# Patient Record
Sex: MALE | Race: WHITE | Employment: UNEMPLOYED | URBAN - METROPOLITAN AREA
[De-identification: names, ages, dates, MRNs, and addresses within clinical notes are randomized per-mention and may not be internally consistent; named-entity substitution may affect disease eponyms.]

---

## 2018-01-19 PROBLEM — I48.19 PERSISTENT ATRIAL FIBRILLATION (HCC): Status: ACTIVE | Noted: 2018-01-19

## 2020-02-26 PROBLEM — R07.9 CHEST PAIN: Status: ACTIVE | Noted: 2020-02-26

## 2020-02-27 PROBLEM — R91.8 LUNG NODULES: Status: ACTIVE | Noted: 2020-02-27

## 2020-06-18 PROBLEM — Z86.718 PERSONAL HISTORY OF OTHER VENOUS THROMBOSIS AND EMBOLISM: Status: ACTIVE | Noted: 2018-09-15

## 2020-06-18 PROBLEM — N40.0 BPH (BENIGN PROSTATIC HYPERPLASIA): Status: ACTIVE | Noted: 2019-10-02

## 2020-06-18 PROBLEM — F17.200 TOBACCO DEPENDENCE: Status: ACTIVE | Noted: 2019-10-02

## 2020-06-18 PROBLEM — Z86.711 HISTORY OF PULMONARY EMBOLISM: Status: ACTIVE | Noted: 2018-09-15

## 2020-06-18 PROBLEM — F10.129 ALCOHOL ABUSE WITH INTOXICATION, UNSPECIFIED (HCC): Status: ACTIVE | Noted: 2018-09-15

## 2020-06-18 PROBLEM — F10.21 ALCOHOL DEPENDENCE IN REMISSION (HCC): Status: ACTIVE | Noted: 2019-10-03

## 2020-06-18 PROBLEM — I10 ESSENTIAL HYPERTENSION: Status: ACTIVE | Noted: 2018-09-15

## 2020-06-18 PROBLEM — L30.1 DYSHIDROTIC ECZEMA: Status: ACTIVE | Noted: 2019-10-02

## 2020-06-18 PROBLEM — K21.9 GASTRO-ESOPHAGEAL REFLUX DISEASE WITHOUT ESOPHAGITIS: Status: ACTIVE | Noted: 2018-09-15

## 2021-04-09 PROBLEM — F32.A ANXIETY AND DEPRESSION: Status: ACTIVE | Noted: 2021-04-09

## 2021-04-09 PROBLEM — Z72.0 TOBACCO USE: Status: ACTIVE | Noted: 2021-04-09

## 2021-04-09 PROBLEM — D68.51 FACTOR V LEIDEN MUTATION (HCC): Status: ACTIVE | Noted: 2021-04-09

## 2021-04-09 PROBLEM — K22.70 BARRETT'S ESOPHAGUS WITHOUT DYSPLASIA: Status: ACTIVE | Noted: 2021-04-09

## 2021-04-09 PROBLEM — F41.9 ANXIETY AND DEPRESSION: Status: ACTIVE | Noted: 2021-04-09

## 2021-04-09 PROBLEM — R07.9 CHEST PAIN: Status: RESOLVED | Noted: 2020-02-26 | Resolved: 2021-04-09

## 2021-04-09 PROBLEM — L20.82 FLEXURAL ECZEMA: Status: ACTIVE | Noted: 2021-04-09

## 2021-04-12 PROBLEM — R07.9 CHEST PAIN: Status: ACTIVE | Noted: 2021-04-12

## 2021-04-12 PROBLEM — I26.99 PULMONARY EMBOLI (HCC): Status: ACTIVE | Noted: 2021-04-12

## 2021-07-04 PROBLEM — F10.139 ALCOHOL ABUSE WITH WITHDRAWAL, UNSPECIFIED (HCC): Status: ACTIVE | Noted: 2021-07-04

## 2021-07-04 PROBLEM — F10.20 SEVERE ALCOHOL USE DISORDER (HCC): Chronic | Status: ACTIVE | Noted: 2021-07-04

## 2021-07-04 PROBLEM — F10.930 ALCOHOL WITHDRAWAL SYNDROME WITHOUT COMPLICATION (HCC): Status: ACTIVE | Noted: 2021-07-04

## 2021-07-04 PROBLEM — Z59.00 HOMELESS: Chronic | Status: ACTIVE | Noted: 2021-07-04

## 2021-08-15 PROBLEM — R55 SYNCOPE AND COLLAPSE: Status: ACTIVE | Noted: 2021-08-15

## 2021-08-16 PROBLEM — U07.1 COVID-19: Status: ACTIVE | Noted: 2021-08-16

## 2021-08-16 PROBLEM — I95.1 ORTHOSTATIC HYPOTENSION: Status: ACTIVE | Noted: 2021-08-16

## 2021-08-16 PROBLEM — J40 BRONCHITIS DUE TO COVID-19 VIRUS: Status: ACTIVE | Noted: 2021-08-16

## 2022-11-02 ENCOUNTER — OFFICE VISIT (OUTPATIENT)
Dept: INTERNAL MEDICINE | Age: 57
End: 2022-11-02
Payer: COMMERCIAL

## 2022-11-02 VITALS
DIASTOLIC BLOOD PRESSURE: 98 MMHG | HEIGHT: 74 IN | HEART RATE: 84 BPM | WEIGHT: 290 LBS | RESPIRATION RATE: 16 BRPM | BODY MASS INDEX: 37.22 KG/M2 | OXYGEN SATURATION: 98 % | SYSTOLIC BLOOD PRESSURE: 144 MMHG

## 2022-11-02 DIAGNOSIS — G89.29 CHRONIC BILATERAL LOW BACK PAIN WITH BILATERAL SCIATICA: ICD-10-CM

## 2022-11-02 DIAGNOSIS — Z72.0 TOBACCO USE: ICD-10-CM

## 2022-11-02 DIAGNOSIS — L20.82 FLEXURAL ECZEMA: Primary | ICD-10-CM

## 2022-11-02 DIAGNOSIS — R91.8 LUNG NODULES: ICD-10-CM

## 2022-11-02 DIAGNOSIS — M54.41 CHRONIC BILATERAL LOW BACK PAIN WITH BILATERAL SCIATICA: ICD-10-CM

## 2022-11-02 DIAGNOSIS — D68.51 FACTOR V LEIDEN MUTATION (HCC): ICD-10-CM

## 2022-11-02 DIAGNOSIS — F33.42 RECURRENT MAJOR DEPRESSIVE DISORDER, IN FULL REMISSION (HCC): ICD-10-CM

## 2022-11-02 DIAGNOSIS — Z86.718 PERSONAL HISTORY OF OTHER VENOUS THROMBOSIS AND EMBOLISM: ICD-10-CM

## 2022-11-02 DIAGNOSIS — K22.70 BARRETT'S ESOPHAGUS WITHOUT DYSPLASIA: ICD-10-CM

## 2022-11-02 DIAGNOSIS — E66.01 CLASS 2 SEVERE OBESITY DUE TO EXCESS CALORIES WITH SERIOUS COMORBIDITY AND BODY MASS INDEX (BMI) OF 37.0 TO 37.9 IN ADULT (HCC): ICD-10-CM

## 2022-11-02 DIAGNOSIS — F10.21 ALCOHOL DEPENDENCE IN REMISSION (HCC): ICD-10-CM

## 2022-11-02 DIAGNOSIS — I10 ESSENTIAL HYPERTENSION: ICD-10-CM

## 2022-11-02 DIAGNOSIS — M54.42 CHRONIC BILATERAL LOW BACK PAIN WITH BILATERAL SCIATICA: ICD-10-CM

## 2022-11-02 DIAGNOSIS — I48.0 PAROXYSMAL ATRIAL FIBRILLATION (HCC): ICD-10-CM

## 2022-11-02 PROBLEM — I26.99 PULMONARY EMBOLI (HCC): Status: RESOLVED | Noted: 2021-04-12 | Resolved: 2022-11-02

## 2022-11-02 PROBLEM — I48.19 PERSISTENT ATRIAL FIBRILLATION (HCC): Status: RESOLVED | Noted: 2018-01-19 | Resolved: 2022-11-02

## 2022-11-02 PROBLEM — F41.9 ANXIETY AND DEPRESSION: Status: RESOLVED | Noted: 2021-04-09 | Resolved: 2022-11-02

## 2022-11-02 PROBLEM — F10.930 ALCOHOL WITHDRAWAL SYNDROME WITHOUT COMPLICATION (HCC): Status: RESOLVED | Noted: 2021-07-04 | Resolved: 2022-11-02

## 2022-11-02 PROBLEM — F32.A ANXIETY AND DEPRESSION: Status: RESOLVED | Noted: 2021-04-09 | Resolved: 2022-11-02

## 2022-11-02 PROBLEM — R07.9 CHEST PAIN: Status: RESOLVED | Noted: 2021-04-12 | Resolved: 2022-11-02

## 2022-11-02 PROBLEM — J40 BRONCHITIS DUE TO COVID-19 VIRUS: Status: RESOLVED | Noted: 2021-08-16 | Resolved: 2022-11-02

## 2022-11-02 PROBLEM — K21.9 GASTRO-ESOPHAGEAL REFLUX DISEASE WITHOUT ESOPHAGITIS: Status: RESOLVED | Noted: 2018-09-15 | Resolved: 2022-11-02

## 2022-11-02 PROBLEM — U07.1 BRONCHITIS DUE TO COVID-19 VIRUS: Status: RESOLVED | Noted: 2021-08-16 | Resolved: 2022-11-02

## 2022-11-02 PROCEDURE — G8484 FLU IMMUNIZE NO ADMIN: HCPCS | Performed by: NURSE PRACTITIONER

## 2022-11-02 PROCEDURE — G8427 DOCREV CUR MEDS BY ELIG CLIN: HCPCS | Performed by: NURSE PRACTITIONER

## 2022-11-02 PROCEDURE — 4004F PT TOBACCO SCREEN RCVD TLK: CPT | Performed by: NURSE PRACTITIONER

## 2022-11-02 PROCEDURE — 3074F SYST BP LT 130 MM HG: CPT | Performed by: NURSE PRACTITIONER

## 2022-11-02 PROCEDURE — 99204 OFFICE O/P NEW MOD 45 MIN: CPT | Performed by: NURSE PRACTITIONER

## 2022-11-02 PROCEDURE — G8417 CALC BMI ABV UP PARAM F/U: HCPCS | Performed by: NURSE PRACTITIONER

## 2022-11-02 PROCEDURE — 3017F COLORECTAL CA SCREEN DOC REV: CPT | Performed by: NURSE PRACTITIONER

## 2022-11-02 PROCEDURE — 3078F DIAST BP <80 MM HG: CPT | Performed by: NURSE PRACTITIONER

## 2022-11-02 RX ORDER — BUPROPION HYDROCHLORIDE 300 MG/1
TABLET ORAL
COMMUNITY
Start: 2022-10-12

## 2022-11-02 RX ORDER — ACETAMINOPHEN 325 MG/1
650 TABLET ORAL EVERY 6 HOURS PRN
COMMUNITY

## 2022-11-02 RX ORDER — POLYETHYLENE GLYCOL 3350 17 G/17G
POWDER, FOR SOLUTION ORAL
COMMUNITY
Start: 2022-09-08

## 2022-11-02 RX ORDER — GABAPENTIN 100 MG/1
100 CAPSULE ORAL 2 TIMES DAILY
Qty: 60 CAPSULE | Refills: 2 | Status: SHIPPED | OUTPATIENT
Start: 2022-11-02 | End: 2022-12-02

## 2022-11-02 RX ORDER — LORATADINE 10 MG/1
TABLET ORAL
COMMUNITY
Start: 2022-01-19

## 2022-11-02 RX ORDER — APIXABAN 5 MG/1
1 TABLET, FILM COATED ORAL 2 TIMES DAILY
COMMUNITY
Start: 2022-10-24 | End: 2022-11-02 | Stop reason: SDUPTHER

## 2022-11-02 RX ORDER — CLOBETASOL PROPIONATE 0.5 MG/G
OINTMENT TOPICAL
Qty: 60 G | Refills: 1 | Status: SHIPPED | OUTPATIENT
Start: 2022-11-02

## 2022-11-02 RX ORDER — APIXABAN 5 MG/1
5 TABLET, FILM COATED ORAL 2 TIMES DAILY
Qty: 60 TABLET | Refills: 5 | Status: SHIPPED | OUTPATIENT
Start: 2022-11-02

## 2022-11-02 RX ORDER — DULOXETIN HYDROCHLORIDE 30 MG/1
CAPSULE, DELAYED RELEASE ORAL
COMMUNITY
Start: 2022-10-12

## 2022-11-02 RX ORDER — LOSARTAN POTASSIUM 50 MG/1
50 TABLET ORAL DAILY
Qty: 30 TABLET | Refills: 1 | Status: SHIPPED | OUTPATIENT
Start: 2022-11-02

## 2022-11-02 SDOH — ECONOMIC STABILITY: FOOD INSECURITY: WITHIN THE PAST 12 MONTHS, YOU WORRIED THAT YOUR FOOD WOULD RUN OUT BEFORE YOU GOT MONEY TO BUY MORE.: NEVER TRUE

## 2022-11-02 SDOH — ECONOMIC STABILITY: FOOD INSECURITY: WITHIN THE PAST 12 MONTHS, THE FOOD YOU BOUGHT JUST DIDN'T LAST AND YOU DIDN'T HAVE MONEY TO GET MORE.: NEVER TRUE

## 2022-11-02 ASSESSMENT — PATIENT HEALTH QUESTIONNAIRE - PHQ9
SUM OF ALL RESPONSES TO PHQ QUESTIONS 1-9: 0
SUM OF ALL RESPONSES TO PHQ QUESTIONS 1-9: 0
2. FEELING DOWN, DEPRESSED OR HOPELESS: 0
8. MOVING OR SPEAKING SO SLOWLY THAT OTHER PEOPLE COULD HAVE NOTICED. OR THE OPPOSITE, BEING SO FIGETY OR RESTLESS THAT YOU HAVE BEEN MOVING AROUND A LOT MORE THAN USUAL: 0
5. POOR APPETITE OR OVEREATING: 0
10. IF YOU CHECKED OFF ANY PROBLEMS, HOW DIFFICULT HAVE THESE PROBLEMS MADE IT FOR YOU TO DO YOUR WORK, TAKE CARE OF THINGS AT HOME, OR GET ALONG WITH OTHER PEOPLE: 0
SUM OF ALL RESPONSES TO PHQ QUESTIONS 1-9: 0
7. TROUBLE CONCENTRATING ON THINGS, SUCH AS READING THE NEWSPAPER OR WATCHING TELEVISION: 0
SUM OF ALL RESPONSES TO PHQ QUESTIONS 1-9: 0
SUM OF ALL RESPONSES TO PHQ9 QUESTIONS 1 & 2: 0
6. FEELING BAD ABOUT YOURSELF - OR THAT YOU ARE A FAILURE OR HAVE LET YOURSELF OR YOUR FAMILY DOWN: 0
1. LITTLE INTEREST OR PLEASURE IN DOING THINGS: 0
4. FEELING TIRED OR HAVING LITTLE ENERGY: 0
9. THOUGHTS THAT YOU WOULD BE BETTER OFF DEAD, OR OF HURTING YOURSELF: 0
3. TROUBLE FALLING OR STAYING ASLEEP: 0

## 2022-11-02 ASSESSMENT — ENCOUNTER SYMPTOMS
BLOOD IN STOOL: 0
WHEEZING: 0
BACK PAIN: 1
CHEST TIGHTNESS: 0
ABDOMINAL PAIN: 1
NAUSEA: 0
DIARRHEA: 0
SHORTNESS OF BREATH: 0
RESPIRATORY NEGATIVE: 1

## 2022-11-02 ASSESSMENT — SOCIAL DETERMINANTS OF HEALTH (SDOH): HOW HARD IS IT FOR YOU TO PAY FOR THE VERY BASICS LIKE FOOD, HOUSING, MEDICAL CARE, AND HEATING?: NOT HARD AT ALL

## 2022-11-02 NOTE — PROGRESS NOTES
[address] [facility] [address] [facility] PRIMARY CARE  [address] [facility] [address]  Dept: [phone]  Dept Fax: [phone]: [phone]     BOBBY Keith (: 1965) is a 65 y.o. male, New patient, here for evaluation of the following chief complaint(s):  New Patient (Here to establish.), Leg Pain (Bilateral leg pain X7 years.), and Skin Problem (Patient has eczema. Would like to try something for it.)      PCP:  HARRIS Lezama CNP      Here today to [facility]. He has had leg pain bilaterally for 7 years. He feels has gotten worse. Feels like is in upper legs where they connect to his body and does go down his legs somewhat. He feels is not getting around as well as used to. Using a cane. He was doing phys therapy, but had been moving around a lot so not sticking with it like should. He was told to use a cane d/t his balance issues. Has a lot of numbness and tingling in his feet. No longer uses alcohol. For the last 4 yrs has been sober \"99% of time\". His lower back feels stiff, does have a fusion. He is not taking his cymbalta like was, he was reduced to 30mg per his preference d/t not liking side effects. Trying to get off of it, he used to be on 60mg. Is taking it every other day for the last month or so. Has bad eczema. Uses triamcinolone which has worked at times, but doesn't feel like doing anything for him anymore. He has issues with sides of his legs and his back, feels is moderate to severe in nature. \"It really sucks\". Seasons changing will cause flare up. Kline's esophagus: Had upper and lower GI this summer. On protonix for it. He still has stomach issues. Review of Systems   Constitutional: Negative. Negative for fatigue and unexpected weight change. Respiratory: Negative. Negative for chest tightness, shortness of breath and wheezing. Cardiovascular: Negative.   Negative for chest pain, palpitations and leg swelling. Gastrointestinal:  Positive for abdominal pain. Negative for blood in stool, diarrhea and nausea. Musculoskeletal:  Positive for back pain and myalgias. Neurological:  Negative for weakness and light-headedness. Psychiatric/Behavioral: Negative. Negative for dysphoric mood. Past Medical History:   Diagnosis Date    A-fib [facility])     Arthritis     Avascular necrosis of bones of both hips ([address]     Kline esophagus     Brain lesion     has to get routine MRI for monitoring. . in corpus callosum, w/neuro    Chronic back pain     2 herniated disks    Depression     history of SI    DVT (deep venous thrombosis) (HCC)     Bilateral lower extremities in his 30's and was treated with Coumadin. Factor 5 Leiden mutation, heterozygous ([address]     GERD (gastroesophageal reflux disease)     History of pulmonary embolus (PE)     In his 29's and was treated with Coumadin.     Homelessness     HTN (hypertension)     Migraines     Neuropathy     bilateral feet    Previous back surgery     Lumbar region    Pulmonary emboli ([facility] 4/12/2021     Past Surgical History:   Procedure Laterality Date    CARDIOVERSION  2008    ESOPHAGUS SURGERY  01/01/1990    lap neisen- tightened at bottom    GALLBLADDER SURGERY      removal    JOINT REPLACEMENT Bilateral 01/01/2015    bilateral hip replacement    POSTERIOR SPINAL FUSION N/A 2002     Social History     Socioeconomic History    Marital status:      Spouse name: Not on file    Number of children: Not on file    Years of education: Not on file    Highest education level: Not on file   Occupational History    Not on file   Tobacco Use    Smoking status: Every Day     Packs/day: 0.50     Years: 3.00     Pack years: 1.50     Types: Cigarettes     Start date: 2019    Smokeless tobacco: Never    Tobacco comments:     7-10/day, vaping   Substance and Sexual Activity    Alcohol use: No     Comment: denies any ETOH use since recent hospitalization    Drug use: No     Comment: caffeine: 2 cups of tea daily    Sexual activity: Not on file   Other Topics Concern    Not on file   Social History Narrative    Not on file     Social Determinants of Health     Financial Resource Strain: Low Risk     Difficulty of Paying Living Expenses: Not hard at all   Food Insecurity: No Food Insecurity    Worried About Running Out of Food in the Last Year: Never true    Ran Out of Food in the Last Year: Never true   Transportation Needs: Not on file   Physical Activity: Not on file   Stress: Not on file   Social Connections: Not on file   Intimate Partner Violence: Not on file   Housing Stability: Not on file     Family History   Problem Relation Age of Onset    Liver Cancer Mother          from liver cancer    Mental Illness Mother     Mental Illness Maternal Aunt         schizophrenia      Allergies   Allergen Reactions    Xarelto [Rivaroxaban]      Prior to Admission medications    Medication Sig Start Date End Date Taking? Authorizing Provider   buPROPion (WELLBUTRIN XL) 300 MG extended release tablet Take 1 Tablet by mouth every morning 10/12/22  Yes Historical Provider, MD   DULoxetine (CYMBALTA) 30 MG extended release capsule Take 1 Capsule by mouth once daily 10/12/22  Yes Historical Provider, MD   loratadine (CLARITIN) 10 MG tablet  22  Yes Historical Provider, MD   polyethylene glycol (MIRALAX) 17 g PACK packet PLEASE SEE ATTACHED FOR DETAILED DIRECTIONS 22  Yes Historical Provider, MD   acetaminophen (TYLENOL) 325 MG tablet Take 650 mg by mouth every 6 hours as needed for Pain   Yes Historical Provider, MD   clobetasol (TEMOVATE) 0.05 % ointment Apply topically 2 times daily. 22  Yes HARRIS Vyas CNP   gabapentin (NEURONTIN) 100 MG capsule Take 1 capsule by mouth 2 times daily for 30 days.  22 Yes HARRIS Vyas CNP   ELIQUIS 5 MG TABS tablet Take 1 tablet by mouth in the morning and at bedtime 22  Yes Farrah Mar, APRN - CNP   losartan (COZAAR) 50 MG tablet Take 1 tablet by mouth daily 11/2/22  Yes Farrah Mar APRN - CNP   pantoprazole (PROTONIX) 40 MG tablet Take 1 tablet by mouth every morning (before breakfast) for 14 days 8/20/21 11/2/22 Yes Beena Mcbride MD                I have reviewed the patient's medical history in detail and updated the computerized patient record. OBJECTIVE    Vitals:    11/02/22 1441 11/02/22 1448 11/02/22 1515   BP: (!) 132/100 (!) 134/104 (!) 144/98   Site: Right Upper Arm     Position: Sitting     Cuff Size: Medium Adult     Pulse: 84     Resp: 16     SpO2: 98%     Weight: 290 lb (131.5 kg)     Height: 6' 2.02\" (1.88 m)         Physical Exam  Vitals and nursing note reviewed. Constitutional:       General: He is not in acute distress. Appearance: Normal appearance. He is well-developed. He is obese. HENT:      Head: Normocephalic and atraumatic. Neck:      Thyroid: No thyromegaly. Cardiovascular:      Rate and Rhythm: Normal rate and regular rhythm. Heart sounds: Normal heart sounds. No murmur heard. Pulmonary:      Effort: Pulmonary effort is normal. No respiratory distress. Breath sounds: Normal breath sounds. No wheezing. Musculoskeletal:      Cervical back: Normal range of motion. Lumbar back: Tenderness (across lower lumbar area) present. Decreased range of motion. Lymphadenopathy:      Cervical: No cervical adenopathy. Skin:     General: Skin is warm and dry. Findings: Rash (dry rough patches to upper thighs, appears to be resolving. back without any rash, more of a keratosis pilaris appearance to upper back) present. Neurological:      Mental Status: He is alert and oriented to person, place, and time. Gait: Gait abnormal (walking with limp, using cane on right). Psychiatric:         Mood and Affect: Mood normal.         Behavior: Behavior normal.         ASSESSMENT/ PLAN    1. Flexural eczema  Chronic, uncontrolled. Triamcinolone ointment not helping.   - clobetasol (TEMOVATE) 0.05 % ointment; Apply topically 2 times daily. Dispense: 60 g; Refill: 1  -enc to improve skin care: avoid hot showers, use cream based lotions right after showering- eucerin or cetaphil after every shower     2. Recurrent major depressive disorder, in full remission ([facility]  Chronic, stable  Phq9 score negative  -wellbutrin working well. Is weaning off cymbalta d/t his preference so will need to watch for deterioration of mood    3. Paroxysmal atrial fibrillation (HCC)  Chronic, pt reports no issue since 2014 when had cardioversion. Is on eliquis d/t recurrent clots and also factor 5 mutation  - ELIQUIS 5 MG TABS tablet; Take 1 tablet by mouth in the morning and at bedtime  Dispense: 60 tablet; Refill: 5    4. Factor V Leiden mutation ([facility]  Chronic, on eliquis. Sometimes struggles to take twice a day but couldn't tolerate xarelto. Has done better lately. Last yr when had last clot was not taking twice a day routinely.   - ELIQUIS 5 MG TABS tablet; Take 1 tablet by mouth in the morning and at bedtime  Dispense: 60 tablet; Refill: 5    5. Alcohol dependence in remission ([facility]  Chronic, reports being in remission for last 4 yrs \"99% of time\"  -enc to continue sobriety    6. Chronic bilateral low back pain with bilateral sciatica  Chronic, uncontrolled  -reports doing phys therapy which does help but he has moved around so much has not completed it  -weaning off cymbalta d/t side effects, advised this is a chronic pain med. Will try gabapentin  - gabapentin (NEURONTIN) 100 MG capsule; Take 1 capsule by mouth 2 times daily for 30 days. Dispense: 60 capsule; Refill: 2  -[place] reviewed through external source without concerns, was last living in [place] last yr    7. Essential hypertension  Chronic, uncontrolled. Reports when he used to drink heavily had to be on losartan but went away with stopping drinking  -restart on losartan.  Reduce salt intake  - losartan (COZAAR) 50 MG tablet; Take 1 tablet by mouth daily  Dispense: 30 tablet; Refill: 1    8. Kline's esophagus without dysplasia  Chronic, egd done 3/28/22 per care everywhere with Dr. Trinidad Lopes, can't see results though  -continue on protonix  -will need to sign record release for [facility] next visit and [facility]    9. Personal history of other venous thrombosis and embolism  Continue on eliquis. Enc to take bid as directed    10. Class 2 severe obesity due to excess calories with serious comorbidity and body mass index (BMI) of 37.0 to 37.9 in adult [facility])  Chronic, uncontrolled. Poor diet  -The standard range for ages 25 and older is >=18.5 and < 25 kg/m2. Your BMI today was above this range, this falls in the overweight obesity morbid obese category and there are medical benefits to weight loss. BMI monitoring is helful with identifying a weight problem that may be related to a medical condition, or may increase the risk for medical problems. Your BMI and weight management will be followed at subsequent visits with your provider and monitored for progress. GOAL; promoting lifestyle and diet changes in order to reach a healthy weight. 11. Tobacco use  Highly encourage patient to stay away from tobacco products. Patient is not ready to quit. Patient is aware of the risks associated with this habit  -has only been smoking for 3 yrs, enc to stop. He is not motivated, did switch to vaping which he thinks seems better. Advised research is coming that doesn't support that its any less dangerous          Return in about 6 weeks (around 12/14/2022) for bp recheck.      Electronically signed by:  HARRIS Rader CNP   11/2/22

## 2022-11-02 NOTE — PATIENT INSTRUCTIONS
Patient Education        Learning About Benefits From Quitting Smoking  Why is it important to quit smoking? If you're thinking about quitting smoking, you may have a few reasons to be smoke-free. Your health may be one of them. When you quit smoking, you lower your risk for many serious health problems, such as cancer, lung disease, heart attack, stroke, blood vessel disease, and blindness from macular degeneration. When you're smoke-free, you get sick less often, and you heal faster. You are less likely to get colds, flu, bronchitis, and pneumonia. As a nonsmoker, you may find that your mood is better and you are less stressed. When and how will you feel healthier? Quitting has real health benefits that start from day 1 of being smoke-free. And the longer you stay smoke-free, the healthier you get and the better you feel. The first hours  After just 20 minutes, your blood pressure and heart rate go down. That means there's less stress on your heart and blood vessels. Within 12 hours, the level of carbon monoxide in your blood drops back to normal. That makes room for more oxygen. With more oxygen in your body, you may notice that you have more energy than when you smoked. After 2 weeks  Your lungs start to work better. Your risk of heart attack starts to drop. After 1 month  When your lungs are clear, you cough less and breathe deeper, so it's easier to be active. Your sense of taste and smell return. That means you can enjoy food more than you have since you started smoking. Over the years  Over the years, your risks of heart disease, heart attack, and stroke are lower. After 10 years, your risk of dying from lung cancer is cut by about half. And your risk for many other types of cancer is lower too. How would quitting help others in your life? When you quit smoking, you improve the health of everyone who now breathes in your smoke.   Their heart, lung, and cancer risks drop, much like yours.  They are sick less. For babies and small children, living smoke-free means they're less likely to have ear infections, pneumonia, and bronchitis. If you're a woman who is or will be pregnant someday, quitting smoking means a healthier . Children who are close to you are less likely to become adult smokers. Where can you learn more? Go to https://chpepiceweb.ChoicePass. org and sign in to your [employer] account. Enter [id number] [id number] [phone] in the [facility] box to learn more about \"Learning About Benefits From Quitting Smoking. \"     If you do not have an account, please click on the \"Sign Up Now\" link. Current as of: 2021               Content Version: 13.4   Healthwise, [employer]. Care instructions adapted under license by [facility] ([facility]). If you have questions about a medical condition or this instruction, always ask your healthcare professional. [place] [address] any warranty or liability for your use of this information. Patient Education        DASH Diet: Care Instructions  Your Care Instructions     The DASH diet is an eating plan that can help lower your blood pressure. DASH stands for Dietary Approaches to Stop Hypertension. Hypertension is high blood pressure. The DASH diet focuses on eating foods that are high in calcium, potassium, and magnesium. These nutrients can lower blood pressure. The foods that are highest in these nutrients are fruits, vegetables, low-fat dairy products, nuts, seeds, and legumes. But taking calcium, potassium, and magnesium supplements instead of eating foods that are high in those nutrients does not have the same effect. The DASH diet also includes whole grains, fish, and poultry. The DASH diet is one of several lifestyle changes your doctor may recommend to lower your high blood pressure. Your doctor may also want you to decrease the amount of sodium in your diet.  Lowering sodium while following the DASH diet can lower blood pressure even further than just the DASH diet alone. Follow-up care is a key part of your treatment and safety. Be sure to make and go to all appointments, and call your doctor if you are having problems. It's also a good idea to know your test results and keep a list of the medicines you take. How can you care for yourself at home? Following the DASH diet  Eat 4 to 5 servings of fruit each day. A serving is 1 medium-sized piece of fruit, ½ cup chopped or canned fruit, 1/4 cup dried fruit, or 4 ounces (½ cup) of fruit juice. Choose fruit more often than fruit juice. Eat 4 to 5 servings of vegetables each day. A serving is 1 cup of lettuce or raw leafy vegetables, ½ cup of chopped or cooked vegetables, or 4 ounces (½ cup) of vegetable juice. Choose vegetables more often than vegetable juice. Get 2 to 3 servings of low-fat and fat-free dairy each day. A serving is 8 ounces of milk, 1 cup of yogurt, or 1 ½ ounces of cheese. Eat 6 to 8 servings of grains each day. A serving is 1 slice of bread, 1 ounce of dry cereal, or ½ cup of cooked rice, pasta, or cooked cereal. Try to choose whole-grain products as much as possible. Limit lean meat, poultry, and fish to 2 servings each day. A serving is 3 ounces, about the size of a deck of cards. Eat 4 to 5 servings of nuts, seeds, and legumes (cooked dried beans, lentils, and split peas) each week. A serving is 1/3 cup of nuts, 2 tablespoons of seeds, or ½ cup of cooked beans or peas. Limit fats and oils to 2 to 3 servings each day. A serving is 1 teaspoon of vegetable oil or 2 tablespoons of salad dressing. Limit sweets and added sugars to 5 servings or less a week. A serving is 1 tablespoon jelly or jam, ½ cup sorbet, or 1 cup of lemonade. Eat less than 2,300 milligrams (mg) of sodium a day. If you limit your sodium to 1,500 mg a day, you can lower your blood pressure even more.   Be aware that all of these are the suggested number of servings for people who eat 1,800 to 2,000 calories a day. Your recommended number of servings may be different if you need more or fewer calories. Tips for success  Start small. Do not try to make dramatic changes to your diet all at once. You might feel that you are missing out on your favorite foods and then be more likely to not follow the plan. Make small changes, and stick with them. Once those changes become habit, add a few more changes. Try some of the following:  Make it a goal to eat a fruit or vegetable at every meal and at snacks. This will make it easy to get the recommended amount of fruits and vegetables each day. Try yogurt topped with fruit and nuts for a snack or healthy dessert. Add lettuce, tomato, cucumber, and onion to sandwiches. Combine a ready-made pizza crust with low-fat mozzarella cheese and lots of vegetable toppings. Try using tomatoes, squash, spinach, broccoli, carrots, cauliflower, and onions. Have a variety of cut-up vegetables with a low-fat dip as an appetizer instead of chips and dip. Sprinkle sunflower seeds or chopped almonds over salads. Or try adding chopped walnuts or almonds to cooked vegetables. Try some vegetarian meals using beans and peas. Add garbanzo or kidney beans to salads. Make burritos and tacos with mashed funez beans or black beans. Where can you learn more? Go to https://Socialtyzeemmett.healthSchoo. org and sign in to your [employer] account. Enter X527 in the [facility] box to learn more about \"DASH Diet: Care Instructions. \"     If you do not have an account, please click on the \"Sign Up Now\" link. Current as of: January 10, 2022               Content Version: 13.4  © 1359-7621 Healthwise, Inc[employer]. Care instructions adapted under license by [facility] ([facility]).  If you have questions about a medical condition or this instruction, always ask your healthcare professional. [address] any warranty or liability for your use of this information.

## 2022-12-14 ENCOUNTER — OFFICE VISIT (OUTPATIENT)
Dept: INTERNAL MEDICINE | Age: 57
End: 2022-12-14
Payer: COMMERCIAL

## 2022-12-14 VITALS
TEMPERATURE: 98.1 F | HEIGHT: 74 IN | OXYGEN SATURATION: 97 % | HEART RATE: 96 BPM | DIASTOLIC BLOOD PRESSURE: 82 MMHG | BODY MASS INDEX: 36.45 KG/M2 | WEIGHT: 284 LBS | SYSTOLIC BLOOD PRESSURE: 124 MMHG

## 2022-12-14 DIAGNOSIS — I10 ESSENTIAL HYPERTENSION: Primary | ICD-10-CM

## 2022-12-14 DIAGNOSIS — G89.29 CHRONIC BILATERAL LOW BACK PAIN WITH BILATERAL SCIATICA: ICD-10-CM

## 2022-12-14 DIAGNOSIS — M54.41 CHRONIC BILATERAL LOW BACK PAIN WITH BILATERAL SCIATICA: ICD-10-CM

## 2022-12-14 DIAGNOSIS — M54.42 CHRONIC BILATERAL LOW BACK PAIN WITH BILATERAL SCIATICA: ICD-10-CM

## 2022-12-14 PROBLEM — I95.1 ORTHOSTATIC HYPOTENSION: Status: RESOLVED | Noted: 2021-08-16 | Resolved: 2022-12-14

## 2022-12-14 PROBLEM — Z59.00 HOMELESS: Chronic | Status: RESOLVED | Noted: 2021-07-04 | Resolved: 2022-12-14

## 2022-12-14 PROCEDURE — G8427 DOCREV CUR MEDS BY ELIG CLIN: HCPCS | Performed by: NURSE PRACTITIONER

## 2022-12-14 PROCEDURE — 4004F PT TOBACCO SCREEN RCVD TLK: CPT | Performed by: NURSE PRACTITIONER

## 2022-12-14 PROCEDURE — G8417 CALC BMI ABV UP PARAM F/U: HCPCS | Performed by: NURSE PRACTITIONER

## 2022-12-14 PROCEDURE — G8484 FLU IMMUNIZE NO ADMIN: HCPCS | Performed by: NURSE PRACTITIONER

## 2022-12-14 PROCEDURE — 3017F COLORECTAL CA SCREEN DOC REV: CPT | Performed by: NURSE PRACTITIONER

## 2022-12-14 PROCEDURE — 99213 OFFICE O/P EST LOW 20 MIN: CPT | Performed by: NURSE PRACTITIONER

## 2022-12-14 PROCEDURE — 3074F SYST BP LT 130 MM HG: CPT | Performed by: NURSE PRACTITIONER

## 2022-12-14 PROCEDURE — 3078F DIAST BP <80 MM HG: CPT | Performed by: NURSE PRACTITIONER

## 2022-12-14 RX ORDER — LOSARTAN POTASSIUM 50 MG/1
50 TABLET ORAL DAILY
Qty: 30 TABLET | Refills: 2 | Status: SHIPPED | OUTPATIENT
Start: 2022-12-14

## 2022-12-14 RX ORDER — GABAPENTIN 100 MG/1
CAPSULE ORAL
Qty: 100 CAPSULE | Refills: 0 | Status: SHIPPED | OUTPATIENT
Start: 2022-12-14 | End: 2022-12-26

## 2022-12-14 ASSESSMENT — ENCOUNTER SYMPTOMS
RESPIRATORY NEGATIVE: 1
BACK PAIN: 1
SHORTNESS OF BREATH: 0
CHEST TIGHTNESS: 0
WHEEZING: 0

## 2022-12-14 NOTE — PROGRESS NOTES
[address]  PRIMARY CARE  [address] [place] [address]  Dept: [phone]  Dept Fax: [phone]: [phone]     BOBBY Jeronimo (: 1965) is a 65 y.o. male, Established patient, here for evaluation of the following chief complaint(s):  Hypertension (6 week follow up ) and Discuss Medications (Dose of Gabapentin, possibly increasing it)      PCP:  HARRIS Erwin CNP      Pt here today for 6 weeks f/u on hypertension. Takes his med without issues. Not check ing bp at home. Chronic back pain/leg pain. Feels gabapentin has helped some but could be better. His pain is \"moderate now\", but prior to this felt had more bad days. Would like to go up on dose. Has lost some weight since last visit. Has been \"kind of low carbing\", like an Atkins diet. Has done it before. Review of Systems   Constitutional: Negative. Negative for fatigue and unexpected weight change. Respiratory: Negative. Negative for chest tightness, shortness of breath and wheezing. Cardiovascular: Negative. Negative for chest pain, palpitations and leg swelling. Musculoskeletal:  Positive for back pain and myalgias. Neurological:  Negative for weakness and light-headedness. Psychiatric/Behavioral: Negative. Negative for dysphoric mood. Past Medical History:   Diagnosis Date    A-fib [facility])     Arthritis     Avascular necrosis of bones of both hips ([facility] [address] [address]     Kline esophagus     Brain lesion     has to get routine MRI for monitoring. . in corpus callosum, w/neuro    Chronic back pain     2 herniated disks    Depression     history of SI    DVT (deep venous thrombosis) (HCC)     Bilateral lower extremities in his 30's and was treated with Coumadin.     Factor 5 Leiden mutation, heterozygous ([facility]     GERD (gastroesophageal reflux disease)     History of pulmonary embolus (PE)     In his 29's and was treated with Coumadin.     Homeless 2021    Homelessness     HTN (hypertension)     Migraines     Neuropathy     bilateral feet    Previous back surgery     Lumbar region    Pulmonary emboli ([address] 2021     Past Surgical History:   Procedure Laterality Date    CARDIOVERSION  2008    ESOPHAGUS SURGERY  1990    lap neisen- tightened at bottom    GALLBLADDER SURGERY      removal    JOINT REPLACEMENT Bilateral 2015    bilateral hip replacement    POSTERIOR SPINAL FUSION N/A      Social History     Socioeconomic History    Marital status:      Spouse name: Not on file    Number of children: Not on file    Years of education: Not on file    Highest education level: Not on file   Occupational History    Not on file   Tobacco Use    Smoking status: Every Day     Packs/day: 0.50     Years: 3.00     Pack years: 1.50     Types: Cigarettes     Start date:     Smokeless tobacco: Never    Tobacco comments:     7-10/day, vaping   Substance and Sexual Activity    Alcohol use: No     Comment: denies any ETOH use since recent hospitalization    Drug use: No     Comment: caffeine: 2 cups of tea daily    Sexual activity: Not on file   Other Topics Concern    Not on file   Social History Narrative    Not on file     Social Determinants of Health     Financial Resource Strain: Low Risk     Difficulty of Paying Living Expenses: Not hard at all   Food Insecurity: No Food Insecurity    Worried About Running Out of Food in the Last Year: Never true    Ran Out of Food in the Last Year: Never true   Transportation Needs: Not on file   Physical Activity: Not on file   Stress: Not on file   Social Connections: Not on file   Intimate Partner Violence: Not on file   Housing Stability: Not on file     Family History   Problem Relation Age of Onset    Liver Cancer Mother          from liver cancer    Mental Illness Mother     Mental Illness Maternal Aunt         schizophrenia      Allergies   Allergen Reactions Xarelto [Rivaroxaban]      Prior to Admission medications    Medication Sig Start Date End Date Taking? Authorizing Provider   gabapentin (NEURONTIN) 100 MG capsule Take 2 caps by mouth twice a day, may increase to 3 caps twice a day after 2 weeks if needed for additional pain relief. 12/14/22 12/26/22 Yes HARIRS Oviedo CNP   losartan (COZAAR) 50 MG tablet Take 1 tablet by mouth daily 12/14/22  Yes HARRIS Oviedo CNP   buPROPion (WELLBUTRIN XL) 300 MG extended release tablet Take 1 Tablet by mouth every morning 10/12/22  Yes Historical Provider, MD   loratadine (CLARITIN) 10 MG tablet  1/19/22  Yes Historical Provider, MD   polyethylene glycol (MIRALAX) 17 g PACK packet PLEASE SEE ATTACHED FOR DETAILED DIRECTIONS 9/8/22  Yes Historical Provider, MD   acetaminophen (TYLENOL) 325 MG tablet Take 650 mg by mouth every 6 hours as needed for Pain   Yes Historical Provider, MD   clobetasol (TEMOVATE) 0.05 % ointment Apply topically 2 times daily. 11/2/22  Yes HARRIS Oviedo CNP   ELIQUIS 5 MG TABS tablet Take 1 tablet by mouth in the morning and at bedtime 11/2/22  Yes HARRIS Oviedo CNP   pantoprazole (PROTONIX) 40 MG tablet Take 1 tablet by mouth every morning (before breakfast) for 14 days 8/20/21 12/14/22 Yes MD TREVON Soto have reviewed the patient's medical history in detail and updated the computerized patient record. OBJECTIVE    Vitals:    12/14/22 1532   BP: 124/82   Site: Right Upper Arm   Position: Sitting   Cuff Size: Medium Adult   Pulse: 96   Temp: 98.1 °F (36.7 °C)   TempSrc: Infrared   SpO2: 97%   Weight: 284 lb (128.8 kg)   Height: 6' 2.02\" (1.88 m)       Physical Exam  Vitals and nursing note reviewed. Constitutional:       General: He is not in acute distress. Appearance: Normal appearance. He is well-developed. He is obese. HENT:      Head: Normocephalic and atraumatic. Neck:      Thyroid: No thyromegaly.    Cardiovascular:      Rate and Rhythm: Normal rate and regular rhythm. Heart sounds: Normal heart sounds. No murmur heard. Pulmonary:      Effort: Pulmonary effort is normal. No respiratory distress. Breath sounds: Normal breath sounds. No wheezing. Musculoskeletal:      Cervical back: Normal range of motion. Comments: Walks using cane   Lymphadenopathy:      Cervical: No cervical adenopathy. Skin:     General: Skin is warm and dry. Neurological:      Mental Status: He is alert and oriented to person, place, and time. Psychiatric:         Mood and Affect: Mood normal.         Behavior: Behavior normal.         ASSESSMENT/ PLAN    1. Essential hypertension  Chronic, improved with cozaar. Continue same dose  -continue dietary efforts  - losartan (COZAAR) 50 MG tablet; Take 1 tablet by mouth daily  Dispense: 30 tablet; Refill: 2    2. Chronic bilateral low back pain with bilateral sciatica  Chronic, not controlled  -will increase gabapentin to 200mg bid but advised could move up to 300mg bid and to notify me if does this so can adjust his px at pharmacy to take less pills  - gabapentin (NEURONTIN) 100 MG capsule; Take 2 caps by mouth twice a day, may increase to 3 caps twice a day after 2 weeks if needed for additional pain relief. Dispense: 100 capsule; Refill: 0          Return in about 3 months (around 3/14/2023) for bp and pain recheck.      Electronically signed by:  HARRIS Dolan - MINI   12/14/22

## 2023-01-25 ENCOUNTER — OFFICE VISIT (OUTPATIENT)
Dept: INTERNAL MEDICINE | Age: 58
End: 2023-01-25
Payer: COMMERCIAL

## 2023-01-25 VITALS
DIASTOLIC BLOOD PRESSURE: 82 MMHG | HEART RATE: 77 BPM | BODY MASS INDEX: 35.22 KG/M2 | HEIGHT: 74 IN | RESPIRATION RATE: 16 BRPM | WEIGHT: 274.4 LBS | SYSTOLIC BLOOD PRESSURE: 120 MMHG | OXYGEN SATURATION: 96 %

## 2023-01-25 DIAGNOSIS — Z11.59 NEED FOR HEPATITIS C SCREENING TEST: ICD-10-CM

## 2023-01-25 DIAGNOSIS — Z13.1 SCREENING FOR DIABETES MELLITUS: ICD-10-CM

## 2023-01-25 DIAGNOSIS — R10.11 RIGHT UPPER QUADRANT PAIN: ICD-10-CM

## 2023-01-25 DIAGNOSIS — I10 ESSENTIAL HYPERTENSION: Primary | ICD-10-CM

## 2023-01-25 DIAGNOSIS — Z11.4 SCREENING FOR HIV (HUMAN IMMUNODEFICIENCY VIRUS): ICD-10-CM

## 2023-01-25 DIAGNOSIS — L20.82 FLEXURAL ECZEMA: ICD-10-CM

## 2023-01-25 DIAGNOSIS — J30.2 SEASONAL ALLERGIES: ICD-10-CM

## 2023-01-25 DIAGNOSIS — M54.41 CHRONIC BILATERAL LOW BACK PAIN WITH BILATERAL SCIATICA: ICD-10-CM

## 2023-01-25 DIAGNOSIS — F33.42 RECURRENT MAJOR DEPRESSIVE DISORDER, IN FULL REMISSION (HCC): ICD-10-CM

## 2023-01-25 DIAGNOSIS — M54.42 CHRONIC BILATERAL LOW BACK PAIN WITH BILATERAL SCIATICA: ICD-10-CM

## 2023-01-25 DIAGNOSIS — I10 ESSENTIAL HYPERTENSION: ICD-10-CM

## 2023-01-25 DIAGNOSIS — Z13.220 SCREENING, LIPID: ICD-10-CM

## 2023-01-25 DIAGNOSIS — G89.29 CHRONIC BILATERAL LOW BACK PAIN WITH BILATERAL SCIATICA: ICD-10-CM

## 2023-01-25 DIAGNOSIS — Z23 FLU VACCINE NEED: ICD-10-CM

## 2023-01-25 DIAGNOSIS — E78.5 HYPERLIPIDEMIA, UNSPECIFIED HYPERLIPIDEMIA TYPE: Primary | ICD-10-CM

## 2023-01-25 LAB
ALBUMIN SERPL-MCNC: 4.2 G/DL (ref 3.5–4.6)
ALP BLD-CCNC: 59 U/L (ref 35–104)
ALT SERPL-CCNC: <5 U/L (ref 0–41)
ANION GAP SERPL CALCULATED.3IONS-SCNC: 10 MEQ/L (ref 9–15)
AST SERPL-CCNC: 11 U/L (ref 0–40)
BASOPHILS ABSOLUTE: 0.1 K/UL (ref 0–0.2)
BASOPHILS RELATIVE PERCENT: 0.7 %
BILIRUB SERPL-MCNC: 0.4 MG/DL (ref 0.2–0.7)
BUN BLDV-MCNC: 17 MG/DL (ref 6–20)
CALCIUM SERPL-MCNC: 9.2 MG/DL (ref 8.5–9.9)
CHLORIDE BLD-SCNC: 109 MEQ/L (ref 95–107)
CHOLESTEROL, FASTING: 196 MG/DL (ref 0–199)
CO2: 19 MEQ/L (ref 20–31)
CREAT SERPL-MCNC: 0.61 MG/DL (ref 0.7–1.2)
EOSINOPHILS ABSOLUTE: 0.3 K/UL (ref 0–0.7)
EOSINOPHILS RELATIVE PERCENT: 3.8 %
GFR SERPL CREATININE-BSD FRML MDRD: >60 ML/MIN/{1.73_M2}
GLOBULIN: 2.6 G/DL (ref 2.3–3.5)
GLUCOSE FASTING: 94 MG/DL (ref 70–99)
HCT VFR BLD CALC: 44.2 % (ref 42–52)
HDLC SERPL-MCNC: 35 MG/DL (ref 40–59)
HEMOGLOBIN: 15.1 G/DL (ref 14–18)
LDL CHOLESTEROL CALCULATED: 147 MG/DL (ref 0–129)
LIPASE: 25 U/L (ref 12–95)
LYMPHOCYTES ABSOLUTE: 2.3 K/UL (ref 1–4.8)
LYMPHOCYTES RELATIVE PERCENT: 26.5 %
MCH RBC QN AUTO: 31.3 PG (ref 27–31.3)
MCHC RBC AUTO-ENTMCNC: 34.2 % (ref 33–37)
MCV RBC AUTO: 91.6 FL (ref 79–92.2)
MONOCYTES ABSOLUTE: 0.6 K/UL (ref 0.2–0.8)
MONOCYTES RELATIVE PERCENT: 6.5 %
NEUTROPHILS ABSOLUTE: 5.3 K/UL (ref 1.4–6.5)
NEUTROPHILS RELATIVE PERCENT: 62.5 %
PDW BLD-RTO: 12.9 % (ref 11.5–14.5)
PLATELET # BLD: 255 K/UL (ref 130–400)
POTASSIUM SERPL-SCNC: 4.7 MEQ/L (ref 3.4–4.9)
RBC # BLD: 4.82 M/UL (ref 4.7–6.1)
SODIUM BLD-SCNC: 138 MEQ/L (ref 135–144)
TOTAL PROTEIN: 6.8 G/DL (ref 6.3–8)
TRIGLYCERIDE, FASTING: 70 MG/DL (ref 0–150)
WBC # BLD: 8.5 K/UL (ref 4.8–10.8)

## 2023-01-25 PROCEDURE — 3079F DIAST BP 80-89 MM HG: CPT | Performed by: NURSE PRACTITIONER

## 2023-01-25 PROCEDURE — G8482 FLU IMMUNIZE ORDER/ADMIN: HCPCS | Performed by: NURSE PRACTITIONER

## 2023-01-25 PROCEDURE — 90674 CCIIV4 VAC NO PRSV 0.5 ML IM: CPT | Performed by: NURSE PRACTITIONER

## 2023-01-25 PROCEDURE — 3074F SYST BP LT 130 MM HG: CPT | Performed by: NURSE PRACTITIONER

## 2023-01-25 PROCEDURE — G8417 CALC BMI ABV UP PARAM F/U: HCPCS | Performed by: NURSE PRACTITIONER

## 2023-01-25 PROCEDURE — 4004F PT TOBACCO SCREEN RCVD TLK: CPT | Performed by: NURSE PRACTITIONER

## 2023-01-25 PROCEDURE — G8427 DOCREV CUR MEDS BY ELIG CLIN: HCPCS | Performed by: NURSE PRACTITIONER

## 2023-01-25 PROCEDURE — 99214 OFFICE O/P EST MOD 30 MIN: CPT | Performed by: NURSE PRACTITIONER

## 2023-01-25 PROCEDURE — 3017F COLORECTAL CA SCREEN DOC REV: CPT | Performed by: NURSE PRACTITIONER

## 2023-01-25 RX ORDER — GABAPENTIN 300 MG/1
300 CAPSULE ORAL 2 TIMES DAILY
Qty: 60 CAPSULE | Refills: 5 | Status: SHIPPED | OUTPATIENT
Start: 2023-01-25 | End: 2023-02-24

## 2023-01-25 RX ORDER — LOSARTAN POTASSIUM 50 MG/1
50 TABLET ORAL DAILY
Qty: 30 TABLET | Refills: 11 | Status: SHIPPED | OUTPATIENT
Start: 2023-01-25

## 2023-01-25 RX ORDER — GABAPENTIN 300 MG/1
300 CAPSULE ORAL 2 TIMES DAILY
Qty: 60 CAPSULE | Refills: 5 | Status: SHIPPED | OUTPATIENT
Start: 2023-01-25 | End: 2023-01-25

## 2023-01-25 RX ORDER — LORATADINE 10 MG/1
10 TABLET ORAL DAILY
Qty: 30 TABLET | Refills: 11 | Status: SHIPPED | OUTPATIENT
Start: 2023-01-25

## 2023-01-25 ASSESSMENT — ENCOUNTER SYMPTOMS
ABDOMINAL PAIN: 1
BLOOD IN STOOL: 0
CONSTIPATION: 0
NAUSEA: 0
WHEEZING: 0
DIARRHEA: 0
VOMITING: 0
SHORTNESS OF BREATH: 0
CHEST TIGHTNESS: 0
BACK PAIN: 1
RESPIRATORY NEGATIVE: 1

## 2023-01-25 ASSESSMENT — PATIENT HEALTH QUESTIONNAIRE - PHQ9
6. FEELING BAD ABOUT YOURSELF - OR THAT YOU ARE A FAILURE OR HAVE LET YOURSELF OR YOUR FAMILY DOWN: 0
8. MOVING OR SPEAKING SO SLOWLY THAT OTHER PEOPLE COULD HAVE NOTICED. OR THE OPPOSITE, BEING SO FIGETY OR RESTLESS THAT YOU HAVE BEEN MOVING AROUND A LOT MORE THAN USUAL: 0
3. TROUBLE FALLING OR STAYING ASLEEP: 0
SUM OF ALL RESPONSES TO PHQ QUESTIONS 1-9: 0
SUM OF ALL RESPONSES TO PHQ QUESTIONS 1-9: 0
SUM OF ALL RESPONSES TO PHQ9 QUESTIONS 1 & 2: 0
10. IF YOU CHECKED OFF ANY PROBLEMS, HOW DIFFICULT HAVE THESE PROBLEMS MADE IT FOR YOU TO DO YOUR WORK, TAKE CARE OF THINGS AT HOME, OR GET ALONG WITH OTHER PEOPLE: 0
SUM OF ALL RESPONSES TO PHQ QUESTIONS 1-9: 0
7. TROUBLE CONCENTRATING ON THINGS, SUCH AS READING THE NEWSPAPER OR WATCHING TELEVISION: 0
SUM OF ALL RESPONSES TO PHQ QUESTIONS 1-9: 0
9. THOUGHTS THAT YOU WOULD BE BETTER OFF DEAD, OR OF HURTING YOURSELF: 0
5. POOR APPETITE OR OVEREATING: 0
2. FEELING DOWN, DEPRESSED OR HOPELESS: 0
1. LITTLE INTEREST OR PLEASURE IN DOING THINGS: 0
4. FEELING TIRED OR HAVING LITTLE ENERGY: 0

## 2023-01-25 NOTE — PROGRESS NOTES
After obtaining consent, and per orders of Cassy IGLESIAS CNP, injection of flu vaccine given in Left deltoid by Gennie Runner, MA. Patient instructed to remain in clinic for 20 minutes afterwards, and to report any adverse reaction to me immediately. Vaccine Information Sheet, \"Influenza - Inactivated\"  given to Carvel Minder, or parent/legal guardian of  Carvel Minder and verbalized understanding. Patient responses:    Have you ever had a reaction to a flu vaccine? No  Are you able to eat eggs without adverse effects? Yes  Do you have any current illness? No  Have you ever had Guillian Spangle Syndrome? No    Flu vaccine given per order. Please see immunization tab.

## 2023-01-25 NOTE — PROGRESS NOTES
[address] [place] [id number] [facility] PRIMARY CARE  [address] [facility] [address]  Dept: [phone]  Dept Fax: [phone]: [phone]     BOBBY Mckeon (: 1965) is a 65 y.o. male, Established patient, here for evaluation of the following chief complaint(s):  Skin Problem (Started on Clobestol Cream for eczema. Patient reports it is working well. ), Lower Back Pain (Recently started on Gabapentin. Patient reports it is working well. Mild tiredness for side effect.), Depression (Continues on Wellbutrin. Patient is now off of Cymbalta. Wellbutrin working well. No side effects.), Hypertension (Started on Losartan. Patient has been out of Losartan for two days he thinks. Does not monitor at home. ), and Abdominal Pain (Pain underneath right rib for several years. Pain started getting worse in the past two months.)      PCP:  HARRIS Lei CNP      Here for follow up. Chronic back pain: Doing well on gabapentin 300mg twice a day. Feels is helping, doesn't really notice is tired. Cymbalta weaned off without any issues with his pain. Depression: feels ok on wellbutrin and with d/ of cymbalta. Obesity: Was doing low carb diet to help get weight off, went off around new yrs. Seems stable. Hypertension: Took losartan last dose yesterday. No chest pain, shortness of breath. Has been having abdominal pain off and on for couple of yrs. Has had his gallbladder out, so wonders if is related to that. Few times when rolls in bed will get a sharp pain. If lies on his back doesn't always feel right. Is more along side of ribs. Is aware of the weird feeling during day with walking, like wants to splint it but nothing like when hits him at night. Mom and grandpa both  from liver cancer around same age as him. Eczema: topical steroid cream has helped. Review of Systems   Constitutional: Negative. Negative for fatigue and unexpected weight change. Respiratory: Negative. Negative for chest tightness, shortness of breath and wheezing. Cardiovascular: Negative. Negative for chest pain, palpitations and leg swelling. Gastrointestinal:  Positive for abdominal pain. Negative for blood in stool, constipation, diarrhea, nausea and vomiting. Musculoskeletal:  Positive for back pain. Neurological:  Positive for numbness. Negative for weakness and light-headedness. Psychiatric/Behavioral: Negative. Negative for dysphoric mood. The patient is not nervous/anxious. Past Medical History:   Diagnosis Date    A-fib [facility])     Arthritis     Avascular necrosis of bones of both hips ([facility] [address] [address]     Kline esophagus     Brain lesion     has to get routine MRI for monitoring. . in corpus callosum, w/neuro    Chronic back pain     2 herniated disks    Depression     history of SI    DVT (deep venous thrombosis) (HCC)     Bilateral lower extremities in his 30's and was treated with Coumadin. Factor 5 Leiden mutation, heterozygous ([facility] [address]     GERD (gastroesophageal reflux disease)     History of pulmonary embolus (PE)     In his 29's and was treated with Coumadin.     Homeless 7/4/2021    Homelessness     HTN (hypertension)     Migraines     Neuropathy     bilateral feet    Previous back surgery     Lumbar region    Pulmonary emboli ([facility] [address] 4/12/2021     Past Surgical History:   Procedure Laterality Date    CARDIOVERSION  2008    ESOPHAGUS SURGERY  01/01/1990    lap neisen- tightened at bottom    GALLBLADDER SURGERY      removal    JOINT REPLACEMENT Bilateral 01/01/2015    bilateral hip replacement    POSTERIOR SPINAL FUSION N/A 2002     Social History     Socioeconomic History    Marital status:      Spouse name: Not on file    Number of children: Not on file    Years of education: Not on file    Highest education level: Not on file   Occupational History    Not on file   Tobacco Use    Smoking status: Every Day Packs/day: 0.50     Years: 3.00     Pack years: 1.50     Types: Cigarettes     Start date:     Smokeless tobacco: Never    Tobacco comments:     7-10/day, vaping   Substance and Sexual Activity    Alcohol use: No     Comment: denies any ETOH use since recent hospitalization    Drug use: No     Comment: caffeine: 2 cups of tea daily    Sexual activity: Not on file   Other Topics Concern    Not on file   Social History Narrative    Not on file     Social Determinants of Health     Financial Resource Strain: Low Risk     Difficulty of Paying Living Expenses: Not hard at all   Food Insecurity: No Food Insecurity    Worried About Running Out of Food in the Last Year: Never true    Ran Out of Food in the Last Year: Never true   Transportation Needs: Not on file   Physical Activity: Not on file   Stress: Not on file   Social Connections: Not on file   Intimate Partner Violence: Not on file   Housing Stability: Not on file     Family History   Problem Relation Age of Onset    Liver Cancer Mother          from liver cancer    Mental Illness Mother     Mental Illness Maternal Aunt         schizophrenia      Allergies   Allergen Reactions    Xarelto [Rivaroxaban]      Prior to Admission medications    Medication Sig Start Date End Date Taking? Authorizing Provider   loratadine (CLARITIN) 10 MG tablet Take 1 tablet by mouth daily 23  Yes HARRIS Alaniz CNP   losartan (COZAAR) 50 MG tablet Take 1 tablet by mouth daily 23  Yes HARRIS Alaniz CNP   gabapentin (NEURONTIN) 300 MG capsule Take 1 capsule by mouth in the morning and at bedtime for 30 days.  23 Yes HARRIS Alaniz CNP   buPROPion (WELLBUTRIN XL) 300 MG extended release tablet Take 1 Tablet by mouth every morning 10/12/22  Yes Historical Provider, MD   polyethylene glycol (MIRALAX) 17 g PACK packet PLEASE SEE ATTACHED FOR DETAILED DIRECTIONS 22  Yes Historical Provider, MD   acetaminophen (TYLENOL) 325 MG tablet Take 650 mg by mouth every 6 hours as needed for Pain   Yes Historical Provider, MD   clobetasol (TEMOVATE) 0.05 % ointment Apply topically 2 times daily. 11/2/22  Yes HARRIS Graf CNP   ELIQUIS 5 MG TABS tablet Take 1 tablet by mouth in the morning and at bedtime 11/2/22  Yes HARRIS Graf - CNP   pantoprazole (PROTONIX) 40 MG tablet Take 1 tablet by mouth every morning (before breakfast) for 14 days 8/20/21 1/25/23 Yes Igor Cerna MD                I have reviewed the patient's medical history in detail and updated the computerized patient record. OBJECTIVE    Vitals:    01/25/23 1153   BP: 120/82   Site: Left Upper Arm   Position: Sitting   Cuff Size: Medium Adult   Pulse: 77   Resp: 16   SpO2: 96%   Weight: 274 lb 6.4 oz (124.5 kg)   Height: 6' 2.02\" (1.88 m)       Physical Exam  Vitals and nursing note reviewed. Constitutional:       General: He is not in acute distress. Appearance: He is well-developed. HENT:      Head: Normocephalic and atraumatic. Neck:      Thyroid: No thyromegaly. Cardiovascular:      Rate and Rhythm: Normal rate and regular rhythm. Heart sounds: Normal heart sounds. No murmur heard. Pulmonary:      Effort: Pulmonary effort is normal. No respiratory distress. Breath sounds: Normal breath sounds. No wheezing. Abdominal:      General: Bowel sounds are normal. There is no distension. Palpations: Abdomen is soft. There is no mass. Tenderness: There is abdominal tenderness (RUQ to the lower rib margin). There is no right CVA tenderness, left CVA tenderness, guarding or rebound. Hernia: No hernia is present. Musculoskeletal:      Cervical back: Normal range of motion. Lymphadenopathy:      Cervical: No cervical adenopathy. Skin:     General: Skin is warm and dry. Neurological:      Mental Status: He is alert and oriented to person, place, and time.    Psychiatric:         Mood and Affect: Mood normal.         Behavior: Behavior normal.         ASSESSMENT/ PLAN    1. Essential hypertension  Chronic, stable on losartan. Continue. Check fasting labs  - Comprehensive Metabolic Panel, Fasting; Future  - CBC with Auto Differential; Future  - losartan (COZAAR) 50 MG tablet; Take 1 tablet by mouth daily  Dispense: 30 tablet; Refill: 11    2. Chronic bilateral low back pain with bilateral sciatica  Chronic, improved with gabapentin. Switch to 300mg capsules from 100mg caps d/t stable on dose  - gabapentin (NEURONTIN) 300 MG capsule; Take 1 capsule by mouth in the morning and at bedtime for 30 days. Dispense: 60 capsule; Refill: 5    3. Flexural eczema  Chronic, stable with prn topical steroid    4. Recurrent major depressive disorder, in full remission ([facility] [address]  Chronic, stable on wellbutrin  -weaned self off cymbalta and doing ok    5. Seasonal allergies  Chronic, intermittent. Needs claritin  - loratadine (CLARITIN) 10 MG tablet; Take 1 tablet by mouth daily  Dispense: 30 tablet; Refill: 11    6. Right upper quadrant pain  Chronic, uncontrolled  - Comprehensive Metabolic Panel, Fasting; Future  - CBC with Auto Differential; Future  - CT ABDOMEN PELVIS W IV CONTRAST Additional Contrast? Radiologist Recommendation; Future  - Lipase; Future    7. Need for hepatitis C screening test  - Hepatitis C Antibody; Future    8. Screening for HIV (human immunodeficiency virus)  - HIV Screen; Future  *staff did not draw this test, was missed. Will get next time do labs    9. Screening for diabetes mellitus  - Comprehensive Metabolic Panel, Fasting; Future    10. Screening, lipid  - Lipid, Fasting; Future    11. Flu vaccine need  - Influenza, FLUCELVAX, (age 10 mo+), IM, Preservative Free, 0.5 mL            Return in about 6 months (around 7/25/2023) for bp and pain recheck.      Electronically signed by:  HARRIS Joyce - CNP   1/25/23

## 2023-01-26 LAB
HEPATITIS C ANTIBODY: NONREACTIVE
HIV AG/AB: NONREACTIVE

## 2023-01-26 RX ORDER — ATORVASTATIN CALCIUM 20 MG/1
20 TABLET, FILM COATED ORAL DAILY
Qty: 90 TABLET | Refills: 3 | Status: SHIPPED | OUTPATIENT
Start: 2023-01-26

## 2023-01-26 NOTE — PROGRESS NOTES
Labs overall were good, but do recommend he start a cholesterol medication d/t that result and his other risk factors. I did send this med to pharmacy, but rest was good including pancreas.

## 2023-01-31 ENCOUNTER — TELEPHONE (OUTPATIENT)
Dept: INTERNAL MEDICINE | Age: 58
End: 2023-01-31

## 2023-01-31 NOTE — TELEPHONE ENCOUNTER
[facility] Clinical Staff  Subject: Results Request     QUESTIONS   Results: lab & CT ; Ordered by: Phyllis Sharp   Date Performed: 2023-01-25   ---------------------------------------------------------------------------   --------------   Nacho Ferrell [facility]     [phone]; OK to leave message on voicemail   ---------------------------------------------------------------------------   --------------

## 2023-02-01 NOTE — TELEPHONE ENCOUNTER
I don't have CT results back yet. But, labs overall were pretty good with exception that cholesterol is higher than should be. Given his other risk factors, feel he needs to start on atorvastatin to lower this level and reduce his risk of heart attack and stroke. I already sent in- I am not sure where my previous message went with this info, so sorry about that.

## 2023-03-29 ENCOUNTER — TELEPHONE (OUTPATIENT)
Dept: INTERNAL MEDICINE | Age: 58
End: 2023-03-29

## 2023-03-29 ENCOUNTER — HOSPITAL ENCOUNTER (OUTPATIENT)
Dept: CT IMAGING | Age: 58
Discharge: HOME | End: 2023-03-31
Payer: COMMERCIAL

## 2023-03-29 DIAGNOSIS — R10.11 RIGHT UPPER QUADRANT PAIN: ICD-10-CM

## 2023-03-29 DIAGNOSIS — R91.8 LUNG NODULES: Primary | ICD-10-CM

## 2023-03-29 DIAGNOSIS — N30.00 ACUTE CYSTITIS WITHOUT HEMATURIA: ICD-10-CM

## 2023-03-29 DIAGNOSIS — R93.5 ABNORMAL CT OF THE ABDOMEN: ICD-10-CM

## 2023-03-29 PROCEDURE — 6360000004 HC RX CONTRAST MEDICATION: Performed by: NURSE PRACTITIONER

## 2023-03-29 PROCEDURE — 74177 CT ABD & PELVIS W/CONTRAST: CPT

## 2023-03-29 RX ADMIN — IOPAMIDOL 50 ML: 612 INJECTION, SOLUTION INTRAVENOUS at 14:21

## 2023-03-29 RX ADMIN — IOPAMIDOL 20 ML: 612 INJECTION, SOLUTION INTRAVENOUS at 14:21

## 2023-03-29 NOTE — TELEPHONE ENCOUNTER
LMOM to return my call regarding his CT scan results. I informed him will try calling again tomorrow but nothing wild and crazy, just few things need to talk about (his lung nodules have gotten bigger, one large enough that need repeat CT of lungs. Constipation seen.  Stranding to kidneys- no infection symptoms?)

## 2023-03-30 ENCOUNTER — TELEPHONE (OUTPATIENT)
Dept: INTERNAL MEDICINE | Age: 58
End: 2023-03-30

## 2023-03-30 DIAGNOSIS — G89.29 CHRONIC BILATERAL LOW BACK PAIN WITH BILATERAL SCIATICA: ICD-10-CM

## 2023-03-30 DIAGNOSIS — M54.42 CHRONIC BILATERAL LOW BACK PAIN WITH BILATERAL SCIATICA: ICD-10-CM

## 2023-03-30 DIAGNOSIS — M54.41 CHRONIC BILATERAL LOW BACK PAIN WITH BILATERAL SCIATICA: ICD-10-CM

## 2023-03-30 RX ORDER — GABAPENTIN 300 MG/1
600 CAPSULE ORAL 3 TIMES DAILY
Qty: 120 CAPSULE | Refills: 5 | Status: SHIPPED | OUTPATIENT
Start: 2023-03-30 | End: 2023-04-29

## 2023-03-30 RX ORDER — CIPROFLOXACIN 500 MG/1
500 TABLET, FILM COATED ORAL 2 TIMES DAILY
Qty: 20 TABLET | Refills: 0 | Status: SHIPPED | OUTPATIENT
Start: 2023-03-30 | End: 2023-04-09

## 2023-03-30 NOTE — TELEPHONE ENCOUNTER
I Overheard Juanjose Palencia, on phone conversation with the patient. Alys Schlatter calmly tried to explain to the patient that the pharmacy did not remove the titration instructions from the refill, therefore he was taking more than the prescribed dose. The patient was very upset and yelling and cussing at Alys Schlatter, which could be overheard by the others in the office. She tried to explain and apologized telling him his medication was corrected and available to be picked up at the pharmacy. He demanded that the provider call him and apologize and someone needed to take the blame for this. I explained that if he wished to remain a patient with [facility] he could not treat the staff in this manner.

## 2023-03-30 NOTE — TELEPHONE ENCOUNTER
Spoke with pt regarding results. He has had some urinary issues in recent days, trouble going and some discomfort. Will send in cipro for suspected infection given the nephric stranding seen on CT (and symptomatic). If doesn't get better, will need to be seen for UA and eval. He says had good BM after scan, so not constipated. Agreeable to the lung CT to look at nodules that got larger. No further questions right now.

## 2023-07-26 ENCOUNTER — OFFICE VISIT (OUTPATIENT)
Dept: INTERNAL MEDICINE | Age: 58
End: 2023-07-26

## 2023-07-26 ENCOUNTER — TELEPHONE (OUTPATIENT)
Dept: INTERNAL MEDICINE | Age: 58
End: 2023-07-26

## 2023-07-26 VITALS
SYSTOLIC BLOOD PRESSURE: 132 MMHG | HEIGHT: 74 IN | BODY MASS INDEX: 32.34 KG/M2 | TEMPERATURE: 97.8 F | DIASTOLIC BLOOD PRESSURE: 84 MMHG | OXYGEN SATURATION: 96 % | HEART RATE: 104 BPM | WEIGHT: 252 LBS

## 2023-07-26 DIAGNOSIS — R53.1 WEAKNESS: ICD-10-CM

## 2023-07-26 DIAGNOSIS — M54.42 CHRONIC BILATERAL LOW BACK PAIN WITH BILATERAL SCIATICA: ICD-10-CM

## 2023-07-26 DIAGNOSIS — R91.8 LUNG NODULES: ICD-10-CM

## 2023-07-26 DIAGNOSIS — R11.0 NAUSEA: ICD-10-CM

## 2023-07-26 DIAGNOSIS — R07.81 RIB PAIN ON RIGHT SIDE: ICD-10-CM

## 2023-07-26 DIAGNOSIS — R53.83 FATIGUE, UNSPECIFIED TYPE: ICD-10-CM

## 2023-07-26 DIAGNOSIS — M54.41 CHRONIC BILATERAL LOW BACK PAIN WITH BILATERAL SCIATICA: ICD-10-CM

## 2023-07-26 DIAGNOSIS — I10 ESSENTIAL HYPERTENSION: Primary | ICD-10-CM

## 2023-07-26 DIAGNOSIS — E66.09 CLASS 1 OBESITY DUE TO EXCESS CALORIES WITH SERIOUS COMORBIDITY AND BODY MASS INDEX (BMI) OF 32.0 TO 32.9 IN ADULT: ICD-10-CM

## 2023-07-26 DIAGNOSIS — R93.5 ABNORMAL CT OF THE ABDOMEN: ICD-10-CM

## 2023-07-26 DIAGNOSIS — G89.29 CHRONIC BILATERAL LOW BACK PAIN WITH BILATERAL SCIATICA: ICD-10-CM

## 2023-07-26 DIAGNOSIS — R63.4 WEIGHT LOSS, UNINTENTIONAL: ICD-10-CM

## 2023-07-26 DIAGNOSIS — F10.21 ALCOHOL DEPENDENCE IN REMISSION (HCC): ICD-10-CM

## 2023-07-26 DIAGNOSIS — D68.51 FACTOR V LEIDEN MUTATION (HCC): ICD-10-CM

## 2023-07-26 DIAGNOSIS — I48.0 PAROXYSMAL ATRIAL FIBRILLATION (HCC): ICD-10-CM

## 2023-07-26 DIAGNOSIS — T73.0XXA STARVATION, INITIAL ENCOUNTER: ICD-10-CM

## 2023-07-26 LAB
ALBUMIN SERPL-MCNC: 4.3 G/DL (ref 3.5–4.6)
ALP SERPL-CCNC: 61 U/L (ref 35–104)
ALT SERPL-CCNC: 12 U/L (ref 0–41)
ANION GAP SERPL CALCULATED.3IONS-SCNC: 12 MEQ/L (ref 9–15)
AST SERPL-CCNC: 17 U/L (ref 0–40)
BASOPHILS # BLD: 0.1 K/UL (ref 0–0.2)
BASOPHILS NFR BLD: 1.1 %
BILIRUB SERPL-MCNC: 0.7 MG/DL (ref 0.2–0.7)
BUN SERPL-MCNC: 11 MG/DL (ref 6–20)
CALCIUM SERPL-MCNC: 9.5 MG/DL (ref 8.5–9.9)
CHLORIDE SERPL-SCNC: 108 MEQ/L (ref 95–107)
CO2 SERPL-SCNC: 19 MEQ/L (ref 20–31)
CREAT SERPL-MCNC: 0.73 MG/DL (ref 0.7–1.2)
EOSINOPHIL # BLD: 0.3 K/UL (ref 0–0.7)
EOSINOPHIL NFR BLD: 2.9 %
ERYTHROCYTE [DISTWIDTH] IN BLOOD BY AUTOMATED COUNT: 13 % (ref 11.5–14.5)
GLOBULIN SER CALC-MCNC: 2.9 G/DL (ref 2.3–3.5)
GLUCOSE SERPL-MCNC: 102 MG/DL (ref 70–99)
HCT VFR BLD AUTO: 44.1 % (ref 42–52)
HGB BLD-MCNC: 15 G/DL (ref 14–18)
LYMPHOCYTES # BLD: 2.6 K/UL (ref 1–4.8)
LYMPHOCYTES NFR BLD: 26.8 %
MCH RBC QN AUTO: 31.4 PG (ref 27–31.3)
MCHC RBC AUTO-ENTMCNC: 34.1 % (ref 33–37)
MCV RBC AUTO: 92.2 FL (ref 79–92.2)
MONOCYTES # BLD: 0.5 K/UL (ref 0.2–0.8)
MONOCYTES NFR BLD: 4.9 %
NEUTROPHILS # BLD: 6.3 K/UL (ref 1.4–6.5)
NEUTS SEG NFR BLD: 64.3 %
PLATELET # BLD AUTO: 314 K/UL (ref 130–400)
POTASSIUM SERPL-SCNC: 3.7 MEQ/L (ref 3.4–4.9)
PROT SERPL-MCNC: 7.2 G/DL (ref 6.3–8)
RBC # BLD AUTO: 4.78 M/UL (ref 4.7–6.1)
SODIUM SERPL-SCNC: 139 MEQ/L (ref 135–144)
TSH REFLEX: 1.69 UIU/ML (ref 0.44–3.86)
WBC # BLD AUTO: 9.8 K/UL (ref 4.8–10.8)

## 2023-07-26 RX ORDER — NALOXONE HYDROCHLORIDE 4 MG/.1ML
4 SPRAY NASAL
COMMUNITY
Start: 2023-04-24

## 2023-07-26 RX ORDER — ARIPIPRAZOLE 2 MG/1
TABLET ORAL
COMMUNITY
Start: 2023-07-24

## 2023-07-26 SDOH — ECONOMIC STABILITY: FOOD INSECURITY: WITHIN THE PAST 12 MONTHS, YOU WORRIED THAT YOUR FOOD WOULD RUN OUT BEFORE YOU GOT MONEY TO BUY MORE.: NEVER TRUE

## 2023-07-26 SDOH — ECONOMIC STABILITY: FOOD INSECURITY: WITHIN THE PAST 12 MONTHS, THE FOOD YOU BOUGHT JUST DIDN'T LAST AND YOU DIDN'T HAVE MONEY TO GET MORE.: NEVER TRUE

## 2023-07-26 SDOH — ECONOMIC STABILITY: INCOME INSECURITY: HOW HARD IS IT FOR YOU TO PAY FOR THE VERY BASICS LIKE FOOD, HOUSING, MEDICAL CARE, AND HEATING?: NOT HARD AT ALL

## 2023-07-26 SDOH — ECONOMIC STABILITY: HOUSING INSECURITY
IN THE LAST 12 MONTHS, WAS THERE A TIME WHEN YOU DID NOT HAVE A STEADY PLACE TO SLEEP OR SLEPT IN A SHELTER (INCLUDING NOW)?: NO

## 2023-07-26 NOTE — PROGRESS NOTES
[address] PRIMARY CARE  [facility] [address] [place] [address]  Dept: [phone]  Dept Fax: [phone]: [id number] (: 1965) is a 65 y.o. male, Established patient, here for evaluation of the following chief complaint(s):  Hypertension (6 month f/u for BP/Having pain in Rt side on bottom of ribs, having weakness, itching and nausea )      PCP:  HARRIS Fenton - CNP      Pt here for 6 month f/u on htn. Is taking his bp meds as directed. He feels his diet has been really tight, has been without ability to get to and from food. Moved out here with his roommate. He feels isolated. Meds are mailed to him. He is in [facility]. Feels no services could help him. He feels weakness. Along with weakness for last few months. Has a lot of pain in his right side, not so back a lot of times but when has coffee his pain will come. It is on his right rib area. Lying in bed a lot. His roommate does nothing for him. Moved in with him to go to , but they only did 3 meetings. Review of Systems   Constitutional:         See HPI for ROS       Past Medical History:   Diagnosis Date    A-fib ([address])     Arthritis     Avascular necrosis of bones of both hips ([address])     Kline esophagus     Brain lesion     has to get routine MRI for monitoring. . in corpus callosum, w/neuro    Chronic back pain     2 herniated disks    Depression     history of SI    DVT (deep venous thrombosis) (HCC)     Bilateral lower extremities in his 30's and was treated with Coumadin. Factor 5 Leiden mutation, heterozygous ([address])     GERD (gastroesophageal reflux disease)     History of pulmonary embolus (PE)     In his 29's and was treated with Coumadin.     Homeless 2021    Homelessness     HTN (hypertension)     Migraines     Neuropathy     bilateral feet    Previous back surgery     Lumbar region    Pulmonary

## 2023-07-27 NOTE — TELEPHONE ENCOUNTER
Labs were all normal. Please get the CT of chest done as soon as possible. I have also reached out to social work to see if they can help in his situation in any way.

## 2023-07-27 NOTE — PROGRESS NOTES
Called medical records at [address] Was sent to incorrect number initially. Will keep tabs on this until received.

## 2023-08-11 ENCOUNTER — HOSPITAL ENCOUNTER (OUTPATIENT)
Dept: CT IMAGING | Age: 58
End: 2023-08-11
Payer: COMMERCIAL

## 2023-08-11 DIAGNOSIS — R93.5 ABNORMAL CT OF THE ABDOMEN: ICD-10-CM

## 2023-08-11 DIAGNOSIS — R91.8 LUNG NODULES: ICD-10-CM

## 2023-08-11 DIAGNOSIS — R63.4 WEIGHT LOSS, UNINTENTIONAL: ICD-10-CM

## 2023-08-11 DIAGNOSIS — R07.81 RIB PAIN ON RIGHT SIDE: ICD-10-CM

## 2023-08-11 DIAGNOSIS — R53.83 FATIGUE, UNSPECIFIED TYPE: ICD-10-CM

## 2023-08-11 PROCEDURE — 6360000004 HC RX CONTRAST MEDICATION: Performed by: NURSE PRACTITIONER

## 2023-08-11 PROCEDURE — 71260 CT THORAX DX C+: CPT

## 2023-08-11 RX ADMIN — IOPAMIDOL 50 ML: 612 INJECTION, SOLUTION INTRAVENOUS at 14:13

## 2023-08-12 DIAGNOSIS — D68.51 FACTOR V LEIDEN MUTATION (HCC): ICD-10-CM

## 2023-08-12 DIAGNOSIS — I48.0 PAROXYSMAL ATRIAL FIBRILLATION (HCC): ICD-10-CM

## 2023-08-14 ENCOUNTER — TELEPHONE (OUTPATIENT)
Dept: FAMILY MEDICINE CLINIC | Age: 58
End: 2023-08-14

## 2023-08-14 ENCOUNTER — TELEPHONE (OUTPATIENT)
Dept: INTERNAL MEDICINE | Age: 58
End: 2023-08-14

## 2023-08-14 DIAGNOSIS — R63.4 WEIGHT LOSS, UNINTENTIONAL: ICD-10-CM

## 2023-08-14 DIAGNOSIS — R11.0 NAUSEA: ICD-10-CM

## 2023-08-14 DIAGNOSIS — R91.8 LUNG NODULES: Primary | ICD-10-CM

## 2023-08-14 RX ORDER — APIXABAN 5 MG/1
5 TABLET, FILM COATED ORAL 2 TIMES DAILY
Qty: 60 TABLET | Refills: 5 | Status: SHIPPED | OUTPATIENT
Start: 2023-08-14 | End: 2023-08-17 | Stop reason: SDUPTHER

## 2023-08-14 RX ORDER — ONDANSETRON 4 MG/1
4 TABLET, ORALLY DISINTEGRATING ORAL 3 TIMES DAILY PRN
Qty: 21 TABLET | Refills: 0 | Status: SHIPPED | OUTPATIENT
Start: 2023-08-14

## 2023-08-14 NOTE — TELEPHONE ENCOUNTER
Yes, I just sent zofran. Since he is having other symptoms- nausea and weight loss along with the right sided pain, I am going to refer to pulmo anyway to see if they think waiting 6 months for CT scan is ok or if other intervention needed.

## 2023-08-14 NOTE — TELEPHONE ENCOUNTER
Spoke to patient he is aware. He said he is having a problem with nausea and wants to know if you can prescribe him something for it?

## 2023-08-14 NOTE — TELEPHONE ENCOUNTER
Comments:     Last Office Visit (last PCP visit):   7/26/2023    Next Visit Date:  No future appointments. **If hasn't been seen in over a year OR hasn't followed up according to last diabetes/ADHD visit, make appointment for patient before sending refill to provider.     Rx requested:  Requested Prescriptions     Pending Prescriptions Disp Refills    ELIQUIS 5 MG TABS tablet [Pharmacy Med Name: Eliquis 5 mg tablet] 60 tablet 5     Sig: Take 1 tablet by mouth in the morning and at bedtime

## 2023-08-14 NOTE — TELEPHONE ENCOUNTER
Please let him know the CT did see the lung nodule of concern. It is flagged as mildly suspicious and will need to recheck in 6 months with another CT scan. Thank you for getting this test done as know it is hard for him to arrange rides.  Will need to plan for this again in Feb.

## 2023-08-17 DIAGNOSIS — D68.51 FACTOR V LEIDEN MUTATION (HCC): ICD-10-CM

## 2023-08-17 DIAGNOSIS — I48.0 PAROXYSMAL ATRIAL FIBRILLATION (HCC): ICD-10-CM

## 2023-08-17 RX ORDER — APIXABAN 5 MG/1
5 TABLET, FILM COATED ORAL 2 TIMES DAILY
Qty: 60 TABLET | Refills: 5 | Status: SHIPPED | OUTPATIENT
Start: 2023-08-17

## 2023-08-17 NOTE — TELEPHONE ENCOUNTER
Comments: Pt is out of medication    Last Office Visit (last PCP visit):   7/26/2023    Next Visit Date:  No future appointments. **If hasn't been seen in over a year OR hasn't followed up according to last diabetes/ADHD visit, make appointment for patient before sending refill to provider.     Rx requested:  Requested Prescriptions     Pending Prescriptions Disp Refills    ELIQUIS 5 MG TABS tablet 60 tablet 5     Sig: Take 1 tablet by mouth in the morning and at bedtime

## 2023-09-26 DIAGNOSIS — M54.42 CHRONIC BILATERAL LOW BACK PAIN WITH BILATERAL SCIATICA: ICD-10-CM

## 2023-09-26 DIAGNOSIS — G89.29 CHRONIC BILATERAL LOW BACK PAIN WITH BILATERAL SCIATICA: ICD-10-CM

## 2023-09-26 DIAGNOSIS — M54.41 CHRONIC BILATERAL LOW BACK PAIN WITH BILATERAL SCIATICA: ICD-10-CM

## 2023-09-27 RX ORDER — GABAPENTIN 300 MG/1
600 CAPSULE ORAL 3 TIMES DAILY
Qty: 180 CAPSULE | Refills: 5 | Status: SHIPPED | OUTPATIENT
Start: 2023-09-27 | End: 2024-03-25

## 2023-09-27 NOTE — TELEPHONE ENCOUNTER
Comments:     Last Office Visit (last PCP visit):   7/26/2023    Next Visit Date:  No future appointments. **If hasn't been seen in over a year OR hasn't followed up according to last diabetes/ADHD visit, make appointment for patient before sending refill to provider. Rx requested:  Requested Prescriptions     Pending Prescriptions Disp Refills    gabapentin (NEURONTIN) 300 MG capsule 120 capsule 5     Sig: Take 2 capsules by mouth 3 times daily for 30 days.

## 2023-10-28 ENCOUNTER — HOSPITAL ENCOUNTER (EMERGENCY)
Facility: HOSPITAL | Age: 58
Discharge: HOME | End: 2023-10-28
Attending: EMERGENCY MEDICINE
Payer: COMMERCIAL

## 2023-10-28 ENCOUNTER — APPOINTMENT (OUTPATIENT)
Dept: RADIOLOGY | Facility: HOSPITAL | Age: 58
End: 2023-10-28
Payer: COMMERCIAL

## 2023-10-28 VITALS
HEIGHT: 74 IN | RESPIRATION RATE: 16 BRPM | OXYGEN SATURATION: 96 % | BODY MASS INDEX: 33.37 KG/M2 | WEIGHT: 260 LBS | SYSTOLIC BLOOD PRESSURE: 147 MMHG | HEART RATE: 108 BPM | TEMPERATURE: 98.1 F | DIASTOLIC BLOOD PRESSURE: 100 MMHG

## 2023-10-28 DIAGNOSIS — M79.605 PAIN OF LEFT LOWER EXTREMITY: Primary | ICD-10-CM

## 2023-10-28 LAB
ANION GAP SERPL CALC-SCNC: 15 MMOL/L (ref 10–20)
APTT PPP: 24 SECONDS (ref 27–38)
BASOPHILS # BLD AUTO: 0.04 X10*3/UL (ref 0–0.1)
BASOPHILS NFR BLD AUTO: 0.6 %
BUN SERPL-MCNC: 16 MG/DL (ref 6–23)
CALCIUM SERPL-MCNC: 9.3 MG/DL (ref 8.6–10.3)
CARDIAC TROPONIN I PNL SERPL HS: 6 NG/L (ref 0–20)
CHLORIDE SERPL-SCNC: 108 MMOL/L (ref 98–107)
CO2 SERPL-SCNC: 26 MMOL/L (ref 21–32)
CREAT SERPL-MCNC: 0.62 MG/DL (ref 0.5–1.3)
D DIMER PPP FEU-MCNC: 746 NG/ML FEU
EOSINOPHIL # BLD AUTO: 0.24 X10*3/UL (ref 0–0.7)
EOSINOPHIL NFR BLD AUTO: 3.8 %
ERYTHROCYTE [DISTWIDTH] IN BLOOD BY AUTOMATED COUNT: 13.9 % (ref 11.5–14.5)
GFR SERPL CREATININE-BSD FRML MDRD: >90 ML/MIN/1.73M*2
GLUCOSE SERPL-MCNC: 136 MG/DL (ref 74–99)
HCT VFR BLD AUTO: 39.7 % (ref 41–52)
HGB BLD-MCNC: 13.8 G/DL (ref 13.5–17.5)
IMM GRANULOCYTES # BLD AUTO: 0.02 X10*3/UL (ref 0–0.7)
IMM GRANULOCYTES NFR BLD AUTO: 0.3 % (ref 0–0.9)
INR PPP: 0.9 (ref 0.9–1.1)
LYMPHOCYTES # BLD AUTO: 2.14 X10*3/UL (ref 1.2–4.8)
LYMPHOCYTES NFR BLD AUTO: 33.9 %
MCH RBC QN AUTO: 33 PG (ref 26–34)
MCHC RBC AUTO-ENTMCNC: 34.8 G/DL (ref 32–36)
MCV RBC AUTO: 95 FL (ref 80–100)
MONOCYTES # BLD AUTO: 0.65 X10*3/UL (ref 0.1–1)
MONOCYTES NFR BLD AUTO: 10.3 %
NEUTROPHILS # BLD AUTO: 3.22 X10*3/UL (ref 1.2–7.7)
NEUTROPHILS NFR BLD AUTO: 51.1 %
NRBC BLD-RTO: 0 /100 WBCS (ref 0–0)
PLATELET # BLD AUTO: 191 X10*3/UL (ref 150–450)
PMV BLD AUTO: 8.4 FL (ref 7.5–11.5)
POTASSIUM SERPL-SCNC: 3.8 MMOL/L (ref 3.5–5.3)
PROTHROMBIN TIME: 10.7 SECONDS (ref 9.8–12.8)
RBC # BLD AUTO: 4.18 X10*6/UL (ref 4.5–5.9)
SODIUM SERPL-SCNC: 145 MMOL/L (ref 136–145)
WBC # BLD AUTO: 6.3 X10*3/UL (ref 4.4–11.3)

## 2023-10-28 PROCEDURE — 85730 THROMBOPLASTIN TIME PARTIAL: CPT | Performed by: EMERGENCY MEDICINE

## 2023-10-28 PROCEDURE — 93005 ELECTROCARDIOGRAM TRACING: CPT

## 2023-10-28 PROCEDURE — 85379 FIBRIN DEGRADATION QUANT: CPT | Performed by: EMERGENCY MEDICINE

## 2023-10-28 PROCEDURE — 2550000001 HC RX 255 CONTRASTS: Performed by: EMERGENCY MEDICINE

## 2023-10-28 PROCEDURE — 85610 PROTHROMBIN TIME: CPT | Performed by: EMERGENCY MEDICINE

## 2023-10-28 PROCEDURE — 84484 ASSAY OF TROPONIN QUANT: CPT | Performed by: EMERGENCY MEDICINE

## 2023-10-28 PROCEDURE — 71275 CT ANGIOGRAPHY CHEST: CPT | Mod: MG

## 2023-10-28 PROCEDURE — 80053 COMPREHEN METABOLIC PANEL: CPT | Performed by: EMERGENCY MEDICINE

## 2023-10-28 PROCEDURE — 71275 CT ANGIOGRAPHY CHEST: CPT | Performed by: RADIOLOGY

## 2023-10-28 PROCEDURE — 99284 EMERGENCY DEPT VISIT MOD MDM: CPT | Mod: 25 | Performed by: EMERGENCY MEDICINE

## 2023-10-28 PROCEDURE — 85025 COMPLETE CBC W/AUTO DIFF WBC: CPT | Performed by: EMERGENCY MEDICINE

## 2023-10-28 PROCEDURE — 36415 COLL VENOUS BLD VENIPUNCTURE: CPT | Performed by: EMERGENCY MEDICINE

## 2023-10-28 PROCEDURE — 82248 BILIRUBIN DIRECT: CPT | Performed by: EMERGENCY MEDICINE

## 2023-10-28 RX ADMIN — IOHEXOL 180 ML: 350 INJECTION, SOLUTION INTRAVENOUS at 22:28

## 2023-10-28 ASSESSMENT — COLUMBIA-SUICIDE SEVERITY RATING SCALE - C-SSRS
2. HAVE YOU ACTUALLY HAD ANY THOUGHTS OF KILLING YOURSELF?: NO
6. HAVE YOU EVER DONE ANYTHING, STARTED TO DO ANYTHING, OR PREPARED TO DO ANYTHING TO END YOUR LIFE?: NO
1. IN THE PAST MONTH, HAVE YOU WISHED YOU WERE DEAD OR WISHED YOU COULD GO TO SLEEP AND NOT WAKE UP?: NO

## 2023-10-28 ASSESSMENT — PAIN - FUNCTIONAL ASSESSMENT
PAIN_FUNCTIONAL_ASSESSMENT: 0-10
PAIN_FUNCTIONAL_ASSESSMENT: 0-10

## 2023-10-28 ASSESSMENT — PAIN SCALES - GENERAL
PAINLEVEL_OUTOF10: 0 - NO PAIN
PAINLEVEL_OUTOF10: 3
PAINLEVEL_OUTOF10: 0 - NO PAIN
PAINLEVEL_OUTOF10: 0 - NO PAIN

## 2023-10-29 LAB
ALBUMIN SERPL BCP-MCNC: 4.2 G/DL (ref 3.4–5)
ALP SERPL-CCNC: 56 U/L (ref 33–120)
ALT SERPL W P-5'-P-CCNC: 39 U/L (ref 10–52)
AST SERPL W P-5'-P-CCNC: 35 U/L (ref 9–39)
BILIRUB DIRECT SERPL-MCNC: 0 MG/DL (ref 0–0.3)
BILIRUB SERPL-MCNC: 0.3 MG/DL (ref 0–1.2)
PROT SERPL-MCNC: 6.8 G/DL (ref 6.4–8.2)

## 2023-10-29 NOTE — ED PROVIDER NOTES
This patient is a 58-year-old male chief complaint of left leg pain.  By reports a history of factor V Leiden, is currently on Eliquis, reports that he frequently misses doses.  Reports a history of atrial fibrillation.  Reportedly started having some left medial thigh pain concerning to him for a blood clot.  Also reports some chest pain but is unable to characterize how long he has had it.  Reports some shortness of breath with exertion as well.         Review of Systems     Physical Exam  Vitals and nursing note reviewed.   Constitutional:       General: He is not in acute distress.     Appearance: He is well-developed.   HENT:      Head: Normocephalic and atraumatic.   Eyes:      Conjunctiva/sclera: Conjunctivae normal.   Cardiovascular:      Rate and Rhythm: Regular rhythm. Tachycardia present.      Heart sounds: No murmur heard.  Pulmonary:      Effort: Pulmonary effort is normal. No respiratory distress.      Breath sounds: Normal breath sounds.   Abdominal:      Palpations: Abdomen is soft.      Tenderness: There is no abdominal tenderness.   Musculoskeletal:         General: No swelling.      Cervical back: Neck supple.      Comments: I do not appreciate any calf or leg swelling or discoloration.  Patient reports tenderness medial left thigh.   Skin:     General: Skin is warm and dry.      Capillary Refill: Capillary refill takes less than 2 seconds.   Neurological:      Mental Status: He is alert.   Psychiatric:         Mood and Affect: Mood normal.          Labs Reviewed   CBC WITH AUTO DIFFERENTIAL - Abnormal       Result Value    WBC 6.3      nRBC 0.0      RBC 4.18 (*)     Hemoglobin 13.8      Hematocrit 39.7 (*)     MCV 95      MCH 33.0      MCHC 34.8      RDW 13.9      Platelets 191      MPV 8.4      Neutrophils % 51.1      Immature Granulocytes %, Automated 0.3      Lymphocytes % 33.9      Monocytes % 10.3      Eosinophils % 3.8      Basophils % 0.6      Neutrophils Absolute 3.22      Immature  Granulocytes Absolute, Automated 0.02      Lymphocytes Absolute 2.14      Monocytes Absolute 0.65      Eosinophils Absolute 0.24      Basophils Absolute 0.04     BASIC METABOLIC PANEL - Abnormal    Glucose 136 (*)     Sodium 145      Potassium 3.8      Chloride 108 (*)     Bicarbonate 26      Anion Gap 15      Urea Nitrogen 16      Creatinine 0.62      eGFR >90      Calcium 9.3     APTT - Abnormal    aPTT 24 (*)     Narrative:     The APTT is no longer used for monitoring Unfractionated Heparin Therapy. For monitoring Heparin Therapy, use the Heparin Assay.   D-DIMER, NON VTE - Abnormal    D-Dimer Non VTE, Quant (ng/mL FEU) 746 (*)     Narrative:     The D-Dimer assay is reported in ng/mL Fibrinogen Equivalent Units (FEU). The results of this assay should NOT be used for the exclusion of Deep Vein Thrombosis and/or Pulmonary Embolism.   PROTIME-INR - Normal    Protime 10.7      INR 0.9     TROPONIN I, HIGH SENSITIVITY - Normal    Troponin I, High Sensitivity 6      Narrative:     Less than 99th percentile of normal range cutoff-  Female and children under 18 years old <14 ng/L; Male <21 ng/L: Negative  Repeat testing should be performed if clinically indicated.     Female and children under 18 years old 14-50 ng/L; Male 21-50 ng/L:  Consistent with possible cardiac damage and possible increased clinical   risk. Serial measurements may help to assess extent of myocardial damage.     >50 ng/L: Consistent with cardiac damage, increased clinical risk and  myocardial infarction. Serial measurements may help assess extent of   myocardial damage.      NOTE: Children less than 1 year old may have higher baseline troponin   levels and results should be interpreted in conjunction with the overall   clinical context.     NOTE: Troponin I testing is performed using a different   testing methodology at [facility] than at other   [facility]. Direct result comparisons should only   be made within the same  method.   HEPATIC FUNCTION PANEL        CT angio chest for pulmonary embolism   Final Result   1. No acute pulmonary embolism to the segmental arterial level.   2. Eccentric linear defect in the right lower lobar pulmonary artery   as described above suspicious for sequela of chronic PE.   3. Main pulmonary artery is dilated up to 3.3 cm which can be seen   with pulmonary arterial hypertension.   4. Bibasilar atelectasis.  Incidental Finding: Several non-calcified   pulmonary nodules measuring less than 6 mm, likely benign.   (**-YCF-**)        Instructions:  No further follow-up is required, however, if the   patient has high risk factors for primary lung malignancy, follow-up   noncontrast CT scan chest in 12 months may be obtained. (Thierno Llanos et al., Guidelines for management of incidental pulmonary   nodules detected on CT images: From the Fleischner Society 2017,   Radiology. 2017 Jul;284 (1):228-243.) [email or web address]   5. Additional findings as described above.             MACRO:   None        Signed by: Amarilys Healy 10/28/2023 10:40 PM   Dictation workstation:   [id number]           Procedures     Medical Decision Making  Patient presents from home with history of blood clots complaining of left leg pain.  ET scan was negative for an acute PE.  Patient is currently on Eliquis but is not taking it correctly.  He states he often misses his evening dose.  He is encouraged not to miss that anymore.  We will schedule him for an ultrasound at 7:30 AM Monday.  Patient's cardiac work-up was negative for acute MI.  EKG is negative for cardiac dysrhythmia.  Patient requested afterwards to check out his liver but did not want to stay for the results.    Amount and/or Complexity of Data Reviewed  ECG/medicine tests: independent interpretation performed.     Details: Sinus tachycardia rate of 106, narrow complex, normal axis, no ST elevation or depression, no ectopy.         Diagnoses as of 10/28/23 3210    Pain of left lower extremity                    Paco Kumar MD  10/28/23 4170